# Patient Record
Sex: FEMALE | Race: WHITE | NOT HISPANIC OR LATINO | Employment: FULL TIME | URBAN - METROPOLITAN AREA
[De-identification: names, ages, dates, MRNs, and addresses within clinical notes are randomized per-mention and may not be internally consistent; named-entity substitution may affect disease eponyms.]

---

## 2017-03-07 ENCOUNTER — TELEPHONE (OUTPATIENT)
Dept: OBSTETRICS AND GYNECOLOGY | Facility: CLINIC | Age: 67
End: 2017-03-07

## 2017-03-07 NOTE — TELEPHONE ENCOUNTER
Called pt to discuss notice Dr. ORTIZ received from [employer] [place] re: pt use of Minivelle, and risks/benefits of estrogen.  Pt states she has discussed this with Dr Johnson and in her opinion, benefits far-outweigh the risks.

## 2017-06-15 ENCOUNTER — OFFICE VISIT (OUTPATIENT)
Dept: OBSTETRICS AND GYNECOLOGY | Facility: CLINIC | Age: 67
End: 2017-06-15

## 2017-06-15 VITALS
SYSTOLIC BLOOD PRESSURE: 106 MMHG | HEIGHT: 65 IN | WEIGHT: 150 LBS | DIASTOLIC BLOOD PRESSURE: 72 MMHG | BODY MASS INDEX: 24.99 KG/M2

## 2017-06-15 DIAGNOSIS — N81.6 RECTOCELE: ICD-10-CM

## 2017-06-15 DIAGNOSIS — Z01.419 ENCOUNTER FOR GYNECOLOGICAL EXAMINATION WITHOUT ABNORMAL FINDING: ICD-10-CM

## 2017-06-15 DIAGNOSIS — Z79.890 POST-MENOPAUSE ON HRT (HORMONE REPLACEMENT THERAPY): ICD-10-CM

## 2017-06-15 DIAGNOSIS — Z78.0 MENOPAUSE: Primary | ICD-10-CM

## 2017-06-15 DIAGNOSIS — N95.2 VAGINAL ATROPHY: ICD-10-CM

## 2017-06-15 PROCEDURE — 99397 PER PM REEVAL EST PAT 65+ YR: CPT | Performed by: OBSTETRICS & GYNECOLOGY

## 2017-06-15 RX ORDER — ESTRADIOL 0.04 MG/D
1 FILM, EXTENDED RELEASE TRANSDERMAL 2 TIMES WEEKLY
Qty: 8 PATCH | Refills: 12 | Status: SHIPPED | OUTPATIENT
Start: 2017-06-15 | End: 2017-06-19

## 2017-06-15 NOTE — PROGRESS NOTES
"   Chief Complaint   Patient presents with   • Gynecologic Exam   • Rectal Prolapse   • Med Refill       Patricia Mccallum is a 66 y.o. year old  presenting to be seen for her annual exam.This patient has previously had an AH/BSO.  She has had an appendectomy.  She has a known grade 2 rectocele and complains of some pelvic pressure and some bulging from the vagina.  She currently uses estradiol patches, 0.0375 mg every 3-1/2 days and is asymptomatic.  She uses Premarin vaginal cream 1 g weekly.    SCREENING TESTS    Year 2012   Age                         PAP                         HPV high risk                         Mammogram     benign                    CYNDI score                         Breast MRI                         Lipids                         Vitamin D                         Colonoscopy                         DEXA  Frax (hip/any)                         Ovarian Screen                           Enter the month test was performed.  If month not known, enter \"X'  · Black numbers = normal results  · Red numbers = abnormal results  · Black X = patient reported normal  · Red X - patient reported abnormal      Referred by:    Profession:    Other info:        She exercises regularly: yes.  She wears her seat belt: yes.  She has concerns about domestic violence: no.  She has noticed changes in height: no    GYN screening history:  · Last mammogram: was done on approximately 2016 and the result was: Birads II (Benign findings)..    No Additional Complaints Reported    The following portions of the patient's history were reviewed and updated as appropriate:vital signs and   She  does not have any pertinent problems on file.  She  has a past surgical history that includes Total abdominal hysterectomy w/ bilateral salpingoophorectomy (); Appendectomy (); Breast Reduction (); Cholecystectomy; " "Breast reconstruction (06/2015); Hysterectomy; and Reduction mammaplasty.  Her family history includes Heart attack in her father; Hypertension in her mother; Lung cancer in her mother; Melanoma in her brother and brother; No Known Problems in her daughter, maternal aunt, maternal grandmother, paternal aunt, paternal grandmother, sister, and son. There is no history of BRCA 1/2, Breast cancer, Colon cancer, Endometrial cancer, or Ovarian cancer.  She  reports that she has never smoked. She has never used smokeless tobacco. She reports that she does not drink alcohol or use illicit drugs.  Current Outpatient Prescriptions   Medication Sig Dispense Refill   • ALPRAZolam (XANAX) 0.5 MG tablet Take 0.25 mg by mouth every night.     • Bisacodyl (DULCOLAX PO) Take 1 tablet by mouth Daily.     • calcium carbonate (OS-SEBASTIAN) 600 MG tablet Take 600 mg by mouth daily.     • conjugated estrogens (PREMARIN) 0.625 MG/GM vaginal cream Insert  into the vagina 1 (One) Time Per Week. Insert 1.0 grams intravaginally once a week 30 g 3   • Cyanocobalamin (VITAMIN B-12 PO) Take  by mouth.     • diclofenac (VOLTAREN) 1 % gel gel   0   • DOCUSATE SODIUM PO Take  by mouth.     • fexofenadine (ALLEGRA) 180 MG tablet Take 180 mg by mouth daily.     • estradiol (VIVELLE-DOT) 0.0375 MG/24HR Place 1 patch on the skin 2 (Two) Times a Week for 4 days. 8 patch 12   • Lansoprazole (PREVACID PO) Take  by mouth.       No current facility-administered medications for this visit.      She is allergic to codeine; penicillins; and sudafed [pseudoephedrine]..    Review of Systems  A comprehensive review of systems was taken.  Constitutional: negative for fever, chills, activity change, appetite change, fatigue and unexpected weight change.  Respiratory: negative  Cardiovascular: negative  Gastrointestinal: positive for difficulty expelling stool  Genitourinary:pressure  Musculoskeletal:negative  Behavioral/Psych: negative       /72  Ht 65\" (165.1 " cm)  Wt 150 lb (68 kg)  LMP  (LMP Unknown)  BMI 24.96 kg/m2    Physical Exam    General:  alert; cooperative; well developed; well nourished   Skin:  No suspicious lesions seen   Thyroid: normal to inspection and palpation   Lungs:  clear to auscultation bilaterally   Heart:  regular rate and rhythm, S1, S2 normal, no murmur, click, rub or gallop   Breasts:  Examined in supine position  Symmetric without masses or skin dimpling  Nipples normal without inversion, lesions or discharge  There are no palpable axillary nodes  scars   Abdomen: soft, non-tender; no masses  no umbilical or inginual hernias are present  no hepato-splenomegaly   Pelvis: Clinical staff was present for exam  External genitalia:  normal appearance of the external genitalia including Bartholin's and [place]'s glands.  Vaginal:  normal pink mucosa without prolapse or lesions.  Cervix:  absent.  Uterus:  absent.  Adnexa:  absent, bilateral.  Rectal:  anus visually normal appearing. recto-vaginal exam unremarkable and confirms findings;  Rectocele GRADE 2     Lab Review   No data reviewed    Imaging  Mammogram results- benign         ASSESSMENT  Problems Addressed this Visit        Digestive    Rectocele    Relevant Medications    Bisacodyl (DULCOLAX PO)       Genitourinary    Vaginal atrophy    Relevant Medications    conjugated estrogens (PREMARIN) 0.625 MG/GM vaginal cream    Post-menopause on HRT (hormone replacement therapy)    RESOLVED: Menopause - Primary    Relevant Medications    estradiol (VIVELLE-DOT) 0.0375 MG/24HR      Other Visit Diagnoses     Encounter for gynecological examination without abnormal finding              PLAN    Medications prescribed this encounter:    New Medications Ordered This Visit   Medications   • Bisacodyl (DULCOLAX PO)     Sig: Take 1 tablet by mouth Daily.   • conjugated estrogens (PREMARIN) 0.625 MG/GM vaginal cream     Sig: Insert  into the vagina 1 (One) Time Per Week. Insert 1.0 grams intravaginally once  a week     Dispense:  30 g     Refill:  3   • estradiol (VIVELLE-DOT) 0.0375 MG/24HR     Sig: Place 1 patch on the skin 2 (Two) Times a Week for 4 days.     Dispense:  8 patch     Refill:  12   · I have had a lengthy discussion with this patient about her rectocele and possible surgical intervention.  I have discussed posterior repair.  I have given her pamphlets about this procedure.  · Calcium, 600 mg/ Vit. D, 400 IU daily; regular weight-bearing exercise  · Follow up: 12 month(s)  *Please note that portions of this documentation may have been completed with a voice recognition program.  Efforts were made to edit this dictation, but occasional words may have been mistranscribed.       This note was electronically signed.    BIBIANA Johnson MD  Ana 15, 2017  11:29 AM

## 2017-06-26 RX ORDER — ESTRADIOL 0.05 MG/D
1 FILM, EXTENDED RELEASE TRANSDERMAL 2 TIMES WEEKLY
Qty: 24 PATCH | Refills: 4 | Status: SHIPPED | OUTPATIENT
Start: 2017-06-26

## 2017-06-26 NOTE — TELEPHONE ENCOUNTER
Patient states that there was some confusion about the dose of her estrogen patch when she was here 6/17/17.  She states that she is on 0.05 mg twice each week, and desires that a prescription be sent to her mail order pharmacy.  OK per Dr. Johnson.

## 2017-07-31 ENCOUNTER — TELEPHONE (OUTPATIENT)
Dept: ORTHOPEDIC SURGERY | Facility: CLINIC | Age: 67
End: 2017-07-31

## 2017-07-31 NOTE — TELEPHONE ENCOUNTER
----- Message from Tisha Zuniga sent at 7/31/2017  9:55 AM EDT -----  PATIENT IS REQUESTING A NEW SCRIPT FOR ORTHOTICS TO BE FAXED TO [facility]    IF ANY QUESTIONS CALL PATIENT -[phone]

## 2017-11-27 ENCOUNTER — TRANSCRIBE ORDERS (OUTPATIENT)
Dept: OBSTETRICS AND GYNECOLOGY | Facility: CLINIC | Age: 67
End: 2017-11-27

## 2017-11-27 DIAGNOSIS — Z12.31 VISIT FOR SCREENING MAMMOGRAM: Primary | ICD-10-CM

## 2017-11-30 ENCOUNTER — OFFICE VISIT (OUTPATIENT)
Dept: OBSTETRICS AND GYNECOLOGY | Facility: CLINIC | Age: 67
End: 2017-11-30

## 2017-11-30 VITALS
HEIGHT: 65 IN | SYSTOLIC BLOOD PRESSURE: 118 MMHG | BODY MASS INDEX: 25.19 KG/M2 | WEIGHT: 151.2 LBS | DIASTOLIC BLOOD PRESSURE: 72 MMHG

## 2017-11-30 DIAGNOSIS — N81.6 RECTOCELE: Primary | ICD-10-CM

## 2017-11-30 DIAGNOSIS — N81.5 VAGINAL ENTEROCELE: ICD-10-CM

## 2017-11-30 PROCEDURE — 99214 OFFICE O/P EST MOD 30 MIN: CPT | Performed by: OBSTETRICS & GYNECOLOGY

## 2017-11-30 RX ORDER — VITAMIN E 268 MG
400 CAPSULE ORAL DAILY
COMMUNITY
End: 2018-01-24

## 2017-11-30 NOTE — PROGRESS NOTES
Chief Complaint   Patient presents with   • Rectal Prolapse     discuss repair       Patricia Mccallum is a 66 y.o. year old  presenting to be seen because of complaints that she is having more vaginal bulging and pressure.  She has also noted that she has had some difficulty emptying her bladder due to the pressure from the rectocele.  She denies vaginal bleeding or irritation.  She is currently using estradiol patches every 3-1/2 days and is asymptomatic.    History   Sexual Activity   • Sexual activity: No       SCREENING TESTS    Year 2012   Age                         PAP                         HPV high risk                         Mammogram      benign                   CYNDI score                         Breast MRI                         Lipids                         Vitamin D                         Colonoscopy                         DEXA  Frax (hip/any)                         Ovarian Screen                             No Additional Complaints Reported    The following portions of the patient's history were reviewed and updated as appropriate:vital signs and   She  does not have any pertinent problems on file.  She  has a past surgical history that includes Total abdominal hysterectomy w/ bilateral salpingoophorectomy (); Appendectomy (); Breast Reduction (); Cholecystectomy; Breast reconstruction (2015); Hysterectomy; and Reduction mammaplasty.  Her family history includes Heart attack in her father; Hypertension in her mother; Lung cancer in her mother; Melanoma in her brother and brother; No Known Problems in her daughter, maternal aunt, maternal grandmother, paternal aunt, paternal grandmother, sister, and son. There is no history of BRCA 1/2, Breast cancer, Colon cancer, Endometrial cancer, or Ovarian cancer.  She  reports that she has never smoked. She has never used smokeless  "tobacco. She reports that she does not drink alcohol or use illicit drugs.  Current Outpatient Prescriptions   Medication Sig Dispense Refill   • ALPRAZolam (XANAX) 0.5 MG tablet Take 0.25 mg by mouth every night.     • calcium carbonate (OS-SEBASTIAN) 600 MG tablet Take 600 mg by mouth daily.     • conjugated estrogens (PREMARIN) 0.625 MG/GM vaginal cream Insert  into the vagina 1 (One) Time Per Week. Insert 1.0 grams intravaginally once a week 30 g 3   • Cyanocobalamin (VITAMIN B-12 PO) Take  by mouth.     • diclofenac (VOLTAREN) 1 % gel gel apply 4 grams to affected area four times a day if needed 1 tube 1   • DOCUSATE SODIUM PO Take  by mouth.     • estradiol (VIVELLE-DOT) 0.05 MG/24HR patch Place 1 patch on the skin 2 (Two) Times a Week. 24 patch 4   • fexofenadine (ALLEGRA) 180 MG tablet Take 180 mg by mouth daily.     • vitamin E 400 UNIT capsule Take 400 Units by mouth Daily.     • Bisacodyl (DULCOLAX PO) Take 1 tablet by mouth Daily.     • Lansoprazole (PREVACID PO) Take  by mouth.       No current facility-administered medications for this visit.      She is allergic to codeine; penicillins; and sudafed [pseudoephedrine]..        Review of Systems  A comprehensive review of systems was done.  Constitutional: negative for fever, chills, activity change, appetite change, fatigue and unexpected weight change.  Respiratory: negative  Cardiovascular: negative  Gastrointestinal: negative  Genitourinary:pelvic pressure and vaginal bulging  Musculoskeletal:negative  Behavioral/Psych: negative          /72  Ht 65\" (165.1 cm)  Wt 151 lb 3.2 oz (68.6 kg)  LMP  (LMP Unknown) Comment: S/P AH, BSO  BMI 25.16 kg/m2    Physical Exam    General:  alert; cooperative; well developed; well nourished   Skin:  No suspicious lesions seen   Thyroid: normal to inspection and palpation   Lungs:  clear to auscultation bilaterally   Heart:  regular rate and rhythm, S1, S2 normal, no murmur, click, rub or gallop   Breasts:  Not " performed.   Abdomen: soft, non-tender; no masses  no umbilical or inginual hernias are present  no hepato-splenomegaly   Pelvis: Clinical staff was present for exam  External genitalia:  normal appearance of the external genitalia including Bartholin's and [place]'s glands.  Vaginal:  normal pink mucosa without prolapse or lesions.  Cervix:  absent.  Uterus:  absent.  Adnexa:  non palpable bilaterally.  Rectocele GRADE 2  Enterocele GRADE 2  urethrocele     Lab Review   No data reviewed    Imaging   Mammogram results    Medical counseling was given to patient for the following topics: diagnostic results, instructions for management, risk factor reductions, impressions and risks and benefits of treatment options . Total time of the encounter was 32 minute(s) and 25 minute(s)  was spent in face to face counseling.  I have counseled the patient that I do not believe her grade 2 rectocele/grade 2 enterocele has worsened significantly.  However, since she is having more pelvic pressure and bulging I would recommend that these defects be repaired.  I have indicated that I did not feel her urethrocele needs repair.  I have recommended a posterior repair/enterocele repair.  I have described for her the surgical procedures including the risks of bleeding, infection, rectal injury, bowel injury, and anesthetic risks.     ASSESSMENT  Problems Addressed this Visit        Digestive    Rectocele - Primary    Vaginal enterocele            PLAN    Medications prescribed this encounter:    New Medications Ordered This Visit   Medications   • vitamin E 400 UNIT capsule     Sig: Take 400 Units by mouth Daily.   · January for surgery with Posterior Repair/Enterocele Repair  · Follow up: 6 week(s)  · Calcium, 600 mg/ Vit. D, 400 IU daily     *Please note that portions of this documentation may have been completed with a voice recognition program.  Efforts were made to edit this dictation, but occasional words may have been  mistranscribed.     This note was electronically signed.    BIBIANA Johnson MD  November 30, 2017  3:42 PM

## 2017-12-18 DIAGNOSIS — N81.5 VAGINAL ENTEROCELE: ICD-10-CM

## 2017-12-18 DIAGNOSIS — N81.6 RECTOCELE: Primary | ICD-10-CM

## 2018-01-09 ENCOUNTER — HOSPITAL ENCOUNTER (OUTPATIENT)
Dept: MAMMOGRAPHY | Facility: HOSPITAL | Age: 68
Discharge: HOME | End: 2018-01-09
Attending: OBSTETRICS & GYNECOLOGY | Admitting: OBSTETRICS & GYNECOLOGY

## 2018-01-09 DIAGNOSIS — Z12.31 VISIT FOR SCREENING MAMMOGRAM: ICD-10-CM

## 2018-01-09 PROCEDURE — 77067 SCR MAMMO BI INCL CAD: CPT | Performed by: RADIOLOGY

## 2018-01-09 PROCEDURE — 77063 BREAST TOMOSYNTHESIS BI: CPT

## 2018-01-09 PROCEDURE — 77067 SCR MAMMO BI INCL CAD: CPT

## 2018-01-09 PROCEDURE — 77063 BREAST TOMOSYNTHESIS BI: CPT | Performed by: RADIOLOGY

## 2018-01-22 ENCOUNTER — DOCUMENTATION (OUTPATIENT)
Dept: OBSTETRICS AND GYNECOLOGY | Facility: CLINIC | Age: 68
End: 2018-01-22

## 2018-01-22 NOTE — PROGRESS NOTES
History and Physical    Chief Complaint: Vaginal bulging and pelvic pressure    Patricia Mccallum is a 67 y.o., , presented to the office on 2017 with complaints of vaginal bulging and pressure.  She had experienced some difficulty emptying her bladder.  She was found to have a vaginal enterocele and a rectocele on examination.  She desires repair of these defects.  The surgical procedure of posterior repair and enterocele repair has been explained to the patient including the risks of rectal injury, bowel injury, bleeding, infection, and anesthetic risks.  She voiced understanding of these risks.  Informed consent was signed  She has previously had an /BSO    Past Medical History:   Diagnosis Date   • Anxiety    • Menopause    • Osteoarthritis    • Rectocele     GRADE 1-2   • Vaginal atrophy        Allergies: Codeine; Penicillins; and Sudafed [pseudoephedrine]    Medications:   Current Outpatient Prescriptions:   •  ALPRAZolam (XANAX) 0.5 MG tablet, Take 0.25 mg by mouth every night., Disp: , Rfl:   •  Bisacodyl (DULCOLAX PO), Take 1 tablet by mouth Daily., Disp: , Rfl:   •  calcium carbonate (OS-SEBASTIAN) 600 MG tablet, Take 600 mg by mouth daily., Disp: , Rfl:   •  conjugated estrogens (PREMARIN) 0.625 MG/GM vaginal cream, Insert  into the vagina 1 (One) Time Per Week. Insert 1.0 grams intravaginally once a week, Disp: 30 g, Rfl: 3  •  Cyanocobalamin (VITAMIN B-12 PO), Take  by mouth., Disp: , Rfl:   •  diclofenac (VOLTAREN) 1 % gel gel, apply 4 grams to affected area four times a day if needed, Disp: 1 tube, Rfl: 1  •  DOCUSATE SODIUM PO, Take  by mouth., Disp: , Rfl:   •  estradiol (VIVELLE-DOT) 0.05 MG/24HR patch, Place 1 patch on the skin 2 (Two) Times a Week., Disp: 24 patch, Rfl: 4  •  fexofenadine (ALLEGRA) 180 MG tablet, Take 180 mg by mouth daily., Disp: , Rfl:   •  Lansoprazole (PREVACID PO), Take  by mouth., Disp: , Rfl:   •  vitamin E 400 UNIT capsule, Take 400 Units by mouth Daily., Disp: , Rfl:  "    Previous Surgery:   Past Surgical History:   Procedure Laterality Date   • APPENDECTOMY  1981    WITH AH/BSO   • BILATERAL BREAST REDUCTION  1983   • BREAST RECONSTRUCTION  06/2015   • CHOLECYSTECTOMY     • HYSTERECTOMY     • REDUCTION MAMMAPLASTY     • TOTAL ABDOMINAL HYSTERECTOMY WITH SALPINGO OOPHORECTOMY  1981    BSO        Review of Systems  A comprehensive review of systems was negative.  Constitutional: negative for fever, chills, activity change, appetite change, fatigue and unexpected weight change.  Respiratory: negative  Cardiovascular: negative  Gastrointestinal: negative  Genitourinary:pelvic pressure, no incontinence  Musculoskeletal:negative  Behavioral/Psych: negative      Social History   Substance Use Topics   • Smoking status: Never Smoker   • Smokeless tobacco: Never Used   • Alcohol use No       LMP  (LMP Unknown) Comment: S/P AH, BSO    V.S.- BP- 118/72; Pulse- 84; Ht.-65\"; Wt.- 88.6 kg; BMI- 25.16 kg/M2    Physical Exam  General:  alert; cooperative; well developed; well nourished   Skin:  No suspicious lesions seen   Thyroid: normal to inspection and palpation   Lungs:  breathing is unlabored  clear to auscultation bilaterally   Heart:  regular rate and rhythm, S1, S2 normal, no murmur, click, rub or gallop   Breasts:  Examined in supine position  Symmetric without masses or skin dimpling  Nipples normal without inversion, lesions or discharge  There are no palpable axillary nodes   Abdomen: soft, non-tender; no masses  no umbilical or inginual hernias are present  no hepato-splenomegaly   Pelvis: Clinical staff was present for exam  External genitalia:  normal appearance of the external genitalia including Bartholin's and [place]'s glands.  Vaginal:  normal pink mucosa without prolapse or lesions.  Cervix:  absent.  Uterus:  absent.  Adnexa:  non palpable bilaterally.  Rectal:  anus visually normal appearing. recto-vaginal exam unremarkable and confirms findings;  Rectocele GRADE " 2  Enterocele GRADE 2         Injury to bowel, Injury to rectum, bleeding, infection and anesthestic risks were explained to the patient and she voiced understanding. Informed consent was signed.    No contraindications to planned surgery were detected      Impression: !) Rectocele- grade 2, 2) Enterocele- grade 2        Plan: Posterior repair Enterocele repair.      *Please note that portions of this documentation may have been completed with a voice recognition program.  Efforts were made to edit this dictation, but occasional words may have been mistranscribed.  This note was electronically signed.    BIBIANA Johnson MD  January 22, 2018  1:31 PM

## 2018-01-24 ENCOUNTER — OFFICE VISIT (OUTPATIENT)
Dept: OBSTETRICS AND GYNECOLOGY | Facility: CLINIC | Age: 68
End: 2018-01-24

## 2018-01-24 ENCOUNTER — TELEPHONE (OUTPATIENT)
Dept: OBSTETRICS AND GYNECOLOGY | Facility: CLINIC | Age: 68
End: 2018-01-24

## 2018-01-24 VITALS
WEIGHT: 149.8 LBS | HEIGHT: 65 IN | SYSTOLIC BLOOD PRESSURE: 110 MMHG | DIASTOLIC BLOOD PRESSURE: 78 MMHG | BODY MASS INDEX: 24.96 KG/M2

## 2018-01-24 DIAGNOSIS — N81.5 VAGINAL ENTEROCELE: ICD-10-CM

## 2018-01-24 DIAGNOSIS — N81.6 RECTOCELE: Primary | ICD-10-CM

## 2018-01-24 PROCEDURE — 99213 OFFICE O/P EST LOW 20 MIN: CPT | Performed by: OBSTETRICS & GYNECOLOGY

## 2018-01-24 RX ORDER — ASCORBIC ACID 500 MG
500 TABLET ORAL DAILY
COMMUNITY
End: 2021-08-31

## 2018-01-24 RX ORDER — INFLUENZA VACCINE, ADJUVANTED 15; 15; 15 UG/.5ML; UG/.5ML; UG/.5ML
INJECTION, SUSPENSION INTRAMUSCULAR
Refills: 0 | COMMUNITY
Start: 2017-12-13

## 2018-01-24 RX ORDER — PNEUMOCOCCAL 13-VALENT CONJUGATE VACCINE 2.2; 2.2; 2.2; 2.2; 2.2; 4.4; 2.2; 2.2; 2.2; 2.2; 2.2; 2.2; 2.2 UG/.5ML; UG/.5ML; UG/.5ML; UG/.5ML; UG/.5ML; UG/.5ML; UG/.5ML; UG/.5ML; UG/.5ML; UG/.5ML; UG/.5ML; UG/.5ML; UG/.5ML
INJECTION, SUSPENSION INTRAMUSCULAR
Refills: 0 | COMMUNITY
Start: 2017-12-13

## 2018-01-24 NOTE — PROGRESS NOTES
Chief Complaint   Patient presents with   • Follow-up     patient scheduled for posterior repair on Friday.  wants to be sure that she doesn't need bladder repair.       Patricia Mccallum is a 67 y.o. year old  presenting to be seen because of a desire to have her bladder evaluated.  This patient has a grade 2 rectocele and a grade 2 enterocele.  She has some difficulty expelling stool.  She is scheduled for a posterior repair/enterocele repair at [facility].  She has previously had an AH/BSO in .  She denies urinary incontinence.  She states that she suppresses the rectocele in order to be able to void normally.  She has been trying to void more frequently.    History   Sexual Activity   • Sexual activity: No     SCREENING TESTS    Year 2012   Age                         PAP                         HPV high risk                         Mammogram     benign benign benign                  CYNDI score                         Breast MRI                         Lipids                         Vitamin D                         Colonoscopy                         DEXA  Frax (hip/any)                         Ovarian Screen                             No Additional Complaints Reported    The following portions of the patient's history were reviewed and updated as appropriate:vital signs and   She  does not have any pertinent problems on file.  She  has a past surgical history that includes Total abdominal hysterectomy w/ bilateral salpingoophorectomy (); Appendectomy (); Breast Reduction (); Cholecystectomy; Breast reconstruction (2015); Hysterectomy; and Reduction mammaplasty.  Current Outpatient Prescriptions   Medication Sig Dispense Refill   • vitamin C (ASCORBIC ACID) 500 MG tablet Take 500 mg by mouth Daily.     • ALPRAZolam (XANAX) 0.5 MG tablet Take 0.25 mg by mouth every night.     • Bisacodyl  "(DULCOLAX PO) Take 1 tablet by mouth Daily.     • calcium carbonate (OS-SEBASTIAN) 600 MG tablet Take 600 mg by mouth daily.     • conjugated estrogens (PREMARIN) 0.625 MG/GM vaginal cream Insert  into the vagina 1 (One) Time Per Week. Insert 1.0 grams intravaginally once a week 30 g 3   • Cyanocobalamin (VITAMIN B-12 PO) Take  by mouth.     • diclofenac (VOLTAREN) 1 % gel gel apply 4 grams to affected area four times a day if needed 1 tube 1   • DOCUSATE SODIUM PO Take  by mouth.     • estradiol (VIVELLE-DOT) 0.05 MG/24HR patch Place 1 patch on the skin 2 (Two) Times a Week. 24 patch 4   • fexofenadine (ALLEGRA) 180 MG tablet Take 180 mg by mouth daily.     • FLUAD 0.5 ML suspension prefilled syringe inject 0.5 milliliter intramuscularly  0   • PREVNAR 13 vaccine inject 0.5 milliliter intramuscularly  0     No current facility-administered medications for this visit.      She is allergic to codeine; penicillins; and sudafed [pseudoephedrine]..        Review of Systems  A comprehensive review of systems was not done.  Constitutional: negative for fever, chills, activity change, appetite change, fatigue and unexpected weight change.  Respiratory: not done  Cardiovascular: not done  Gastrointestinal: positive for difficulty expelling stool  Genitourinary:positive for vaginal pressure  Musculoskeletal:negative  Behavioral/Psych: not done          /78  Ht 165.1 cm (65\")  Wt 67.9 kg (149 lb 12.8 oz)  LMP  (LMP Unknown) Comment: S/P AH, BSO  BMI 24.93 kg/m2    Physical Exam    General:  alert; cooperative; well developed; well nourished   Skin:  Not performed.   Thyroid: not examined   Lungs:  clear to auscultation bilaterally   Heart:  regular rate and rhythm, S1, S2 normal, no murmur, click, rub or gallop   Breasts:  Not performed.   Abdomen: soft, non-tender; no masses  no umbilical or inginual hernias are present  no hepato-splenomegaly   Pelvis: Clinical staff was present for exam  External genitalia:  normal " appearance of the external genitalia including Bartholin's and [place]'s glands.  Vaginal:  normal pink mucosa without prolapse or lesions.  Cervix:  absent.  Uterus:  absent.  Adnexa:  absent, bilateral.  Rectocele GRADE 2  Enterocele GRADE 2     Lab Review   No data reviewed    Imaging   Mammogram results- benign    Medical counseling was given to patient for the following topics: diagnostic results, instructions for management, risk factor reductions and impressions . Total time of the encounter was 18 minute(s) and 12 minute(s)  was spent in face to face counseling.  I have counseled the patient that she does have a grade 2 rectocele and grade 2 enterocele.  I have counseled her that the urethra is visible when the labia are spread apart.  I counseled her that she has a minimal cystocele and that I do not feel that an anterior repair is indicated at present.  I have counseled her about leaning forward after she finishes voiding to completely empty her bladder.  I have counseled her once again about posterior repair/enterocele repair including the risks of pain, bleeding, infection, and rectal injury.  She voiced understanding of these risks again          ASSESSMENT  Problems Addressed this Visit        Digestive    Rectocele - Primary    Vaginal enterocele            PLAN    Medications prescribed this encounter:    New Medications Ordered This Visit   Medications   • FLUAD 0.5 ML suspension prefilled syringe     Sig: inject 0.5 milliliter intramuscularly     Refill:  0   • PREVNAR 13 vaccine     Sig: inject 0.5 milliliter intramuscularly     Refill:  0   • vitamin C (ASCORBIC ACID) 500 MG tablet     Sig: Take 500 mg by mouth Daily.   · Posterior repair/Enterocele repair on 1/26/2018  · Follow up: 2 day(s) for surgery  · Calcium, 600 mg/ Vit. D, 400 IU daily     *Please note that portions of this documentation may have been completed with a voice recognition program.  Efforts were made to edit this dictation, but  occasional words may have been mistranscribed.     This note was electronically signed.    BIBIANA Johnson MD  January 24, 2018  1:21 PM

## 2018-01-24 NOTE — TELEPHONE ENCOUNTER
"Pt calling to discuss her Posterior Enterocele Repair scheduled for 1/26/18. I confirmed that we received her PCP's clearance for surgery, and we confirmed that her arrival time on Friday will be at 6:30 AM. She states she is concerned about \"bladder and difficulty voiding at times\" - wondering if she needs something done to correct that also. Told pt that she should come in ASAP so that Dr Johnson can check this in case it is necessary to add any bladder procedure. She will come in today at 1 PM.    "

## 2018-01-26 ENCOUNTER — OUTSIDE FACILITY SERVICE (OUTPATIENT)
Dept: OBSTETRICS AND GYNECOLOGY | Facility: CLINIC | Age: 68
End: 2018-01-26

## 2018-01-26 PROCEDURE — 57250 REPAIR RECTUM & VAGINA: CPT | Performed by: OBSTETRICS & GYNECOLOGY

## 2018-01-26 PROCEDURE — 57268 REPAIR OF BOWEL BULGE: CPT | Performed by: OBSTETRICS & GYNECOLOGY

## 2018-01-29 ENCOUNTER — TELEPHONE (OUTPATIENT)
Dept: OBSTETRICS AND GYNECOLOGY | Facility: CLINIC | Age: 68
End: 2018-01-29

## 2018-01-29 NOTE — TELEPHONE ENCOUNTER
Pt calling with questions after Posterior /Enterocele repair done 1/26/18. Concerned about constipation - states she had a very small BM yesterday but wonders if she needs more that the Colace 100 mg BID. Also c/o very sore/irritated rectum from all the loose stools she had prior to procedure (she chose to take magnesium citrate as bowel prep instead of Bisacodyl 5 mg tabs x 2). Asking if it is OK to use Preparation H suppository for rectal irritation. Per DR ORTIZ: may try using Prep H supp but if does not relieve anal discomfort she should call back and we will send in RX for Analpram     Pt states she is doing really well - no complaints of pain but only mild discomfort. She is using sitz bath 3 x daily, has been out walking her dog and says she feels better being up and moving.

## 2018-02-22 ENCOUNTER — OFFICE VISIT (OUTPATIENT)
Dept: OBSTETRICS AND GYNECOLOGY | Facility: CLINIC | Age: 68
End: 2018-02-22

## 2018-02-22 VITALS
SYSTOLIC BLOOD PRESSURE: 124 MMHG | DIASTOLIC BLOOD PRESSURE: 72 MMHG | BODY MASS INDEX: 25.46 KG/M2 | HEIGHT: 65 IN | WEIGHT: 152.8 LBS

## 2018-02-22 DIAGNOSIS — Z98.890 POSTOPERATIVE STATE: Primary | ICD-10-CM

## 2018-02-22 PROCEDURE — 99024 POSTOP FOLLOW-UP VISIT: CPT | Performed by: OBSTETRICS & GYNECOLOGY

## 2018-02-22 RX ORDER — LANSOPRAZOLE 15 MG/1
15 CAPSULE, DELAYED RELEASE ORAL DAILY
COMMUNITY

## 2018-02-22 RX ORDER — VITAMIN E 268 MG
400 CAPSULE ORAL DAILY
COMMUNITY
End: 2021-08-31

## 2018-02-22 NOTE — PROGRESS NOTES
Chief Complaint   Patient presents with   • Post-op       Patricia Mccallum is a 67 y.o. year old  presenting to be seen for her postoperative visit following a posterior repair/enterocele repair and perineoplasty at [facility].  She has had no postoperative problems.  She complains of some pressure intermittently.  She had discontinued her Premarin cream and has some vaginal dryness.  Bowel movements and urination are normal    Procedure:  Enterocoele Repair and Posterior Repair    ROS:  A comprehensive review of systems was negative.  Constitutional: negative for fever, chills, activity change, appetite change, fatigue and unexpected weight change.  Respiratory: negative   Cardiovascular: negative  Gastrointestinal: negative  Genitourinary:negative  Musculoskeletal:negative  Behavioral/Psych: negative      Symptoms:  Fever  No, Chills/Sweat  No, Nausea/Vomiting    No, Normal Bowel Function  Yes, Normal Urinary Function  Yes and Abnormal Discharge   No    No spotting        Physical Exam:  Lungs:  Normal expansion.  Clear to auscultation.  No rales, rhonchi, or wheezing.  Abdomen:  Soft, non-tender, normal bowel sounds; no bruits, organomegaly or masses.   Pelvic:  Clinical staff was present for exam  External genitalia:  normal appearance of the external genitalia including Bartholin's and [place]'s glands.  Vaginal:  atrophic mucosal changes are present; suture lines intact and dry          Impression: 1) Status post Posterior/Enterocele Repair       PLAN:  1. Follow up: 6 month(s)   2. Resume use of Premarin vaginal cream, 0.5 g twice a week  *Please note that portions of this documentation may have been completed with a voice recognition program.  Efforts were made to edit this dictation, but occasional words may have been mistranscribed.      This note was electronically signed.    BIBIANA Johnson MD  2018  11:38 AM

## 2018-03-26 ENCOUNTER — OFFICE VISIT (OUTPATIENT)
Dept: OBSTETRICS AND GYNECOLOGY | Facility: CLINIC | Age: 68
End: 2018-03-26

## 2018-03-26 ENCOUNTER — APPOINTMENT (OUTPATIENT)
Dept: LAB | Facility: HOSPITAL | Age: 68
End: 2018-03-26

## 2018-03-26 VITALS
HEIGHT: 65 IN | DIASTOLIC BLOOD PRESSURE: 74 MMHG | SYSTOLIC BLOOD PRESSURE: 110 MMHG | WEIGHT: 154.2 LBS | BODY MASS INDEX: 25.69 KG/M2

## 2018-03-26 DIAGNOSIS — N90.89 VULVAR IRRITATION: ICD-10-CM

## 2018-03-26 DIAGNOSIS — R35.0 URINARY FREQUENCY: Primary | ICD-10-CM

## 2018-03-26 PROBLEM — N81.5 VAGINAL ENTEROCELE: Status: RESOLVED | Noted: 2017-11-30 | Resolved: 2018-03-26

## 2018-03-26 PROBLEM — K21.9 GASTROESOPHAGEAL REFLUX DISEASE WITHOUT ESOPHAGITIS: Status: ACTIVE | Noted: 2018-03-26

## 2018-03-26 LAB
BILIRUB UR QL STRIP: NEGATIVE
CLARITY UR: CLEAR
COLOR UR: YELLOW
GLUCOSE UR STRIP-MCNC: NEGATIVE MG/DL
HGB UR QL STRIP.AUTO: NEGATIVE
KETONES UR QL STRIP: NEGATIVE
LEUKOCYTE ESTERASE UR QL STRIP.AUTO: NEGATIVE
NITRITE UR QL STRIP: NEGATIVE
PH UR STRIP.AUTO: <=5 [PH] (ref 5–8)
PROT UR QL STRIP: NEGATIVE
SP GR UR STRIP: 1.01 (ref 1–1.03)
UROBILINOGEN UR QL STRIP: NORMAL

## 2018-03-26 PROCEDURE — 99213 OFFICE O/P EST LOW 20 MIN: CPT | Performed by: OBSTETRICS & GYNECOLOGY

## 2018-03-26 PROCEDURE — 81003 URINALYSIS AUTO W/O SCOPE: CPT | Performed by: OBSTETRICS & GYNECOLOGY

## 2018-03-26 NOTE — PROGRESS NOTES
Chief Complaint   Patient presents with   • Urinary Frequency     urgency, pressure, vulvar tenderness       Patricia Mccallum is a 67 y.o. year old  presenting to be seen because of complaints of urinary frequency and urgency and difficulty initiating micturition.  This patient has previously had an AH/BSO.  She recently underwent a posterior repair/enterocele repair for grade 2 rectocele/grade 2 enterocele.  She had no postoperative problems.  She has resumed use of Premarin vaginal cream, 1 g twice a week.  She also uses estradiol patches, 0.05 mg every 3-1/2 days and is asymptomatic.  She developed urinary symptoms 3 days ago.  She has mild dysuria.  She has no leakage of urine.  She states that she has pelvic pressure.  She states that prior to having a bowel movement the pressure is rather significant and then is relieved after having a bowel movement.  She complains of vulvar irritation and states that she has been using bubble bath nightly.      History   Sexual Activity   • Sexual activity: No     SCREENING TESTS    Year 2012   Age                         PAP                         HPV high risk                         Mammogram     benign  benign                  CYNDI score                         Breast MRI                         Lipids                         Vitamin D                         Colonoscopy                         DEXA  Frax (hip/any)                         Ovarian Screen                             No Additional Complaints Reported    The following portions of the patient's history were reviewed and updated as appropriate:vital signs and   She  does not have any pertinent problems on file.  She  has a past surgical history that includes Total abdominal hysterectomy w/ bilateral salpingoophorectomy (); Appendectomy (); Breast Reduction (); Cholecystectomy; Breast  reconstruction (06/2015); Hysterectomy; Reduction mammaplasty; Posterior repair; and Enterocele repair.  Her family history includes Heart attack in her father; Hypertension in her mother; Lung cancer in her mother; Melanoma in her brother and brother; No Known Problems in her daughter, maternal aunt, maternal grandmother, paternal aunt, paternal grandmother, sister, and son.  She  reports that she has never smoked. She has never used smokeless tobacco. She reports that she does not drink alcohol or use drugs.  Current Outpatient Prescriptions   Medication Sig Dispense Refill   • conjugated estrogens (PREMARIN) 0.625 MG/GM vaginal cream Insert 1 g into the vagina 2 (Two) Times a Week.     • ALPRAZolam (XANAX) 0.5 MG tablet Take 0.25 mg by mouth every night.     • Bisacodyl (DULCOLAX PO) Take 1 tablet by mouth Daily.     • calcium carbonate (OS-SEBASTIAN) 600 MG tablet Take 600 mg by mouth daily.     • Cyanocobalamin (VITAMIN B-12 PO) Take  by mouth.     • diclofenac (VOLTAREN) 1 % gel gel apply 4 grams to affected area four times a day if needed 1 tube 1   • estradiol (VIVELLE-DOT) 0.05 MG/24HR patch Place 1 patch on the skin 2 (Two) Times a Week. 24 patch 4   • fexofenadine (ALLEGRA) 180 MG tablet Take 180 mg by mouth daily.     • FLUAD 0.5 ML suspension prefilled syringe inject 0.5 milliliter intramuscularly  0   • lansoprazole (PREVACID) 15 MG capsule Take 15 mg by mouth Daily.     • PREVNAR 13 vaccine inject 0.5 milliliter intramuscularly  0   • vitamin C (ASCORBIC ACID) 500 MG tablet Take 500 mg by mouth Daily.     • vitamin E 400 UNIT capsule Take 400 Units by mouth Daily.       No current facility-administered medications for this visit.      She is allergic to codeine; penicillins; and sudafed [pseudoephedrine]..        Review of Systems  A comprehensive review of systems was done.  Constitutional: negative for fever, chills, activity change, appetite change, fatigue and unexpected weight change.  Respiratory:  "negative  Cardiovascular: negative  Gastrointestinal: positive for constipation  Genitourinary:positive for vulvar burning, urinary frequency  Musculoskeletal:negative  Behavioral/Psych: negative          /74   Ht 165.1 cm (65\")   Wt 69.9 kg (154 lb 3.2 oz)   LMP  (LMP Unknown) Comment: S/P TEA BSO  BMI 25.66 kg/m²     Physical Exam    General:  alert; cooperative; well developed; well nourished   Skin:  No suspicious lesions seen   Thyroid: normal to inspection and palpation   Lungs:  clear to auscultation bilaterally   Heart:  regular rate and rhythm, S1, S2 normal, no murmur, click, rub or gallop   Breasts:  Not performed.   Abdomen: soft, non-tender; no masses  no umbilical or inginual hernias are present  no hepato-splenomegaly   Pelvis: Clinical staff was present for exam  External genitalia:  normal appearance of the external genitalia including Bartholin's and [place]'s glands.  Vaginal:  normal pink mucosa without prolapse or lesions.  Cervix:  absent.  Uterus:  absent.  Adnexa:  absent, bilateral.  There is no vulvar erythema and no lesions.  There is excellent healing of the repair sites and no ridging of the suture lines.     Lab Review   No data reviewed    Imaging   Mammogram results    Medical counseling was given to patient for the following topics: diagnostic results, instructions for management, risk factor reductions and impressions . Total time of the encounter was 23 minute(s) and 15 minute(s)  was spent in face to face counseling.  I have counseled the patient that there is no evidence of recurrence of her rectocele or enterocele and that healing is adequate.  I have counseled the patient that she does not have evidence of vaginal atrophy.  I have counseled the patient about the need to obtain a catheterized urine specimen to rule out cystitis.  I have counseled the patient to lean forward after she completes voiding to completely empty her bladder.  I have counseled the patient to see " Dr. Curtis Hoang for colonoscopy since she has complaints of pelvic pressure and lower abdominal fullness in the absence of her uterus, tubes, or ovaries.          ASSESSMENT  Problems Addressed this Visit     None      Visit Diagnoses     Urinary frequency    -  Primary    Relevant Orders    Urinalysis With / Culture If Indicated - Urine, Catheter    Vulvar irritation                PLAN    Medications prescribed this encounter:    New Medications Ordered This Visit   Medications   • conjugated estrogens (PREMARIN) 0.625 MG/GM vaginal cream     Sig: Insert 1 g into the vagina 2 (Two) Times a Week.   · Cath U/A sent  · Colonoscopy  · Follow up: 9 month(s)  · Calcium, 600 mg/ Vit. D, 400 IU daily     *Please note that portions of this documentation may have been completed with a voice recognition program.  Efforts were made to edit this dictation, but occasional words may have been mistranscribed.     This note was electronically signed.    BIBIANA Johnson MD  March 26, 2018  10:49 AM

## 2018-04-25 DIAGNOSIS — Z12.11 SCREENING FOR COLON CANCER: Primary | ICD-10-CM

## 2018-04-30 ENCOUNTER — OUTSIDE FACILITY SERVICE (OUTPATIENT)
Dept: GASTROENTEROLOGY | Facility: CLINIC | Age: 68
End: 2018-04-30

## 2018-04-30 PROCEDURE — G0105 COLORECTAL SCRN; HI RISK IND: HCPCS | Performed by: INTERNAL MEDICINE

## 2018-08-01 ENCOUNTER — TELEPHONE (OUTPATIENT)
Dept: ORTHOPEDIC SURGERY | Facility: CLINIC | Age: 68
End: 2018-08-01

## 2018-08-01 NOTE — TELEPHONE ENCOUNTER
PT IS ASKING FOR AN UPDATED ORTHOTICS ORDER FOR HER TURF TOE. I INFORMED HER DR DOTSON WAS OUT OF THE OFFICE UNTIL NEXT Wednesday.

## 2018-08-01 NOTE — TELEPHONE ENCOUNTER
Patient was seen by Dr. Waller July of 2016. She had a rx wrote for turf  toe orthotics. She would like a new rx to get another pair of orthotics. Patient is aware Dr. Waller is out of the office.     Dr. Waller, please advise.

## 2018-08-08 NOTE — TELEPHONE ENCOUNTER
LVM for patient to inform her that I will fax rx for orthotics over to the [facility] and mail her the original rx.

## 2018-08-23 ENCOUNTER — TRANSCRIBE ORDERS (OUTPATIENT)
Dept: ADMINISTRATIVE | Facility: HOSPITAL | Age: 68
End: 2018-08-23

## 2018-08-23 ENCOUNTER — HOSPITAL ENCOUNTER (OUTPATIENT)
Dept: GENERAL RADIOLOGY | Facility: HOSPITAL | Age: 68
Discharge: HOME | End: 2018-08-23
Admitting: SURGERY
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
## 2018-08-23 PROCEDURE — 72052 X-RAY EXAM NECK SPINE 6/>VWS: CPT

## 2018-08-23 PROCEDURE — 73030 X-RAY EXAM OF SHOULDER: CPT

## 2018-12-21 ENCOUNTER — TRANSCRIBE ORDERS (OUTPATIENT)
Dept: ADMINISTRATIVE | Facility: HOSPITAL | Age: 68
End: 2018-12-21

## 2018-12-21 DIAGNOSIS — Z12.31 VISIT FOR SCREENING MAMMOGRAM: Primary | ICD-10-CM

## 2019-02-07 ENCOUNTER — APPOINTMENT (OUTPATIENT)
Dept: MAMMOGRAPHY | Facility: HOSPITAL | Age: 69
End: 2019-02-07
Attending: OBSTETRICS & GYNECOLOGY

## 2019-02-19 ENCOUNTER — HOSPITAL ENCOUNTER (OUTPATIENT)
Dept: MAMMOGRAPHY | Facility: HOSPITAL | Age: 69
Discharge: HOME | End: 2019-02-19
Attending: OBSTETRICS & GYNECOLOGY | Admitting: OBSTETRICS & GYNECOLOGY

## 2019-02-19 DIAGNOSIS — Z12.31 VISIT FOR SCREENING MAMMOGRAM: ICD-10-CM

## 2019-02-19 PROCEDURE — 77063 BREAST TOMOSYNTHESIS BI: CPT | Performed by: RADIOLOGY

## 2019-02-19 PROCEDURE — 77067 SCR MAMMO BI INCL CAD: CPT | Performed by: RADIOLOGY

## 2019-02-19 PROCEDURE — 77067 SCR MAMMO BI INCL CAD: CPT

## 2019-02-19 PROCEDURE — 77063 BREAST TOMOSYNTHESIS BI: CPT

## 2019-02-26 ENCOUNTER — HOSPITAL ENCOUNTER (OUTPATIENT)
Dept: MAMMOGRAPHY | Facility: HOSPITAL | Age: 69
Discharge: HOME | End: 2019-02-26
Admitting: RADIOLOGY

## 2019-02-26 DIAGNOSIS — R92.8 ABNORMAL MAMMOGRAM: ICD-10-CM

## 2019-02-26 PROCEDURE — G0279 TOMOSYNTHESIS, MAMMO: HCPCS

## 2019-02-26 PROCEDURE — G0279 TOMOSYNTHESIS, MAMMO: HCPCS | Performed by: RADIOLOGY

## 2019-02-26 PROCEDURE — 77065 DX MAMMO INCL CAD UNI: CPT | Performed by: RADIOLOGY

## 2019-02-26 PROCEDURE — 77065 DX MAMMO INCL CAD UNI: CPT

## 2019-06-14 ENCOUNTER — HOSPITAL ENCOUNTER (OUTPATIENT)
Dept: GENERAL RADIOLOGY | Facility: HOSPITAL | Age: 69
Discharge: HOME | End: 2019-06-14
Admitting: FAMILY MEDICINE

## 2019-06-14 ENCOUNTER — TRANSCRIBE ORDERS (OUTPATIENT)
Dept: ADMINISTRATIVE | Facility: HOSPITAL | Age: 69
End: 2019-06-14

## 2019-06-14 DIAGNOSIS — M54.2 NECK PAIN: Primary | ICD-10-CM

## 2019-06-14 DIAGNOSIS — T14.90XA TRAUMA: ICD-10-CM

## 2019-06-14 PROCEDURE — 72052 X-RAY EXAM NECK SPINE 6/>VWS: CPT

## 2019-07-15 ENCOUNTER — TRANSCRIBE ORDERS (OUTPATIENT)
Dept: ADMINISTRATIVE | Facility: HOSPITAL | Age: 69
End: 2019-07-15

## 2019-07-15 DIAGNOSIS — R92.8 ABNORMAL MAMMOGRAM: Primary | ICD-10-CM

## 2019-09-16 ENCOUNTER — HOSPITAL ENCOUNTER (OUTPATIENT)
Dept: MAMMOGRAPHY | Facility: HOSPITAL | Age: 69
Discharge: HOME | End: 2019-09-16
Admitting: OBSTETRICS & GYNECOLOGY

## 2019-09-16 DIAGNOSIS — R92.8 ABNORMAL MAMMOGRAM: ICD-10-CM

## 2019-09-16 PROCEDURE — 77065 DX MAMMO INCL CAD UNI: CPT

## 2019-09-16 PROCEDURE — G0279 TOMOSYNTHESIS, MAMMO: HCPCS | Performed by: RADIOLOGY

## 2019-09-16 PROCEDURE — 77065 DX MAMMO INCL CAD UNI: CPT | Performed by: RADIOLOGY

## 2019-09-16 PROCEDURE — G0279 TOMOSYNTHESIS, MAMMO: HCPCS

## 2020-04-02 ENCOUNTER — TELEPHONE (OUTPATIENT)
Dept: ORTHOPEDIC SURGERY | Facility: CLINIC | Age: 70
End: 2020-04-02

## 2020-04-02 NOTE — TELEPHONE ENCOUNTER
PATIENT CALLED REQUESTING ANOTHER ORTHOTICS SCRIPT SENT TO [facility] AT THE [facility]. PLEASE CALL PATIENT [phone]

## 2020-04-03 NOTE — TELEPHONE ENCOUNTER
Faxed the script to [facility] and mailed it to the patient today. Called and left a voicemail letting the patient know this.  Alina

## 2020-04-08 NOTE — TELEPHONE ENCOUNTER
PATIENT CALLED STATED SHE WENT TO GET HER ORTHOTICS  AND SHE WAS INFORMED THEY NEVER GOT THE ORDER. PATIENT ASKED TO HAVE THE ORDER SENT OVER [phone]. PATIENT CAN BE REACHED [phone]

## 2020-06-09 NOTE — TELEPHONE ENCOUNTER
I made a new script and faxed it to [facility] and called to let the patient know.  Alina   epigastric area

## 2020-07-22 ENCOUNTER — TRANSCRIBE ORDERS (OUTPATIENT)
Dept: ADMINISTRATIVE | Facility: HOSPITAL | Age: 70
End: 2020-07-22

## 2020-07-22 DIAGNOSIS — Z12.31 VISIT FOR SCREENING MAMMOGRAM: Primary | ICD-10-CM

## 2020-11-04 ENCOUNTER — HOSPITAL ENCOUNTER (OUTPATIENT)
Dept: MAMMOGRAPHY | Facility: HOSPITAL | Age: 70
Discharge: HOME | End: 2020-11-04
Admitting: OBSTETRICS & GYNECOLOGY

## 2020-11-04 DIAGNOSIS — Z12.31 VISIT FOR SCREENING MAMMOGRAM: ICD-10-CM

## 2020-11-04 PROCEDURE — 77063 BREAST TOMOSYNTHESIS BI: CPT

## 2020-11-04 PROCEDURE — 77067 SCR MAMMO BI INCL CAD: CPT | Performed by: RADIOLOGY

## 2020-11-04 PROCEDURE — 77067 SCR MAMMO BI INCL CAD: CPT

## 2020-11-04 PROCEDURE — 77063 BREAST TOMOSYNTHESIS BI: CPT | Performed by: RADIOLOGY

## 2021-07-08 ENCOUNTER — TELEPHONE (OUTPATIENT)
Dept: ORTHOPEDIC SURGERY | Facility: CLINIC | Age: 71
End: 2021-07-08

## 2021-07-08 NOTE — TELEPHONE ENCOUNTER
PATIENT CALLED ANDSTATED SHE NEEDS A NEW ORTHOTICS SCRIPT SENT TO [facility]. PATIENT WOULD IBETH A CALL BACK TO CONFIRM IT WAS SENT [phone]

## 2021-07-12 NOTE — TELEPHONE ENCOUNTER
Fax number was not connecting, [facility] [place] said they were not having issues.  I emailed the prescription out Friday 07.09.21

## 2021-07-12 NOTE — TELEPHONE ENCOUNTER
I called the patient to let her know we had to mail the prescription Friday.  She said she will call the [facility] Wednesday to set up an appointment with them.

## 2021-07-22 ENCOUNTER — TELEPHONE (OUTPATIENT)
Dept: GASTROENTEROLOGY | Facility: CLINIC | Age: 71
End: 2021-07-22

## 2021-07-22 NOTE — TELEPHONE ENCOUNTER
Dr Hoang,  I spoke with Mrs Mccallum this morning. Patient has hx: Diverticulosis & IBS-D. She hasn't had any IBS symptoms for 10-12 years. Now, she is experiencing Frequent bowel movements(skinny small stool), and severe gas. You advised to take Miralax 2 capfuls daily & Align n the past. The Align caused her to have diarrhea; so she stop taking it. Now, taking Benefiber which is causing gas too. Cut down on Miralax to 1/2 capful. These symptoms are affecting her daily life. Follow up isn't schedule until 8/31/2021 & she doesn't know what to do until then. Please advise. Thanks

## 2021-08-31 ENCOUNTER — OFFICE VISIT (OUTPATIENT)
Dept: GASTROENTEROLOGY | Facility: CLINIC | Age: 71
End: 2021-08-31

## 2021-08-31 VITALS
OXYGEN SATURATION: 97 % | DIASTOLIC BLOOD PRESSURE: 86 MMHG | TEMPERATURE: 97.1 F | BODY MASS INDEX: 23.93 KG/M2 | SYSTOLIC BLOOD PRESSURE: 130 MMHG | HEART RATE: 84 BPM | HEIGHT: 65 IN | WEIGHT: 143.6 LBS

## 2021-08-31 DIAGNOSIS — K57.30 DIVERTICULAR DISEASE OF COLON: Primary | ICD-10-CM

## 2021-08-31 DIAGNOSIS — R19.8 IRREGULAR BOWEL HABITS: ICD-10-CM

## 2021-08-31 PROCEDURE — 99203 OFFICE O/P NEW LOW 30 MIN: CPT | Performed by: INTERNAL MEDICINE

## 2021-08-31 RX ORDER — NITROFURANTOIN 25; 75 MG/1; MG/1
CAPSULE ORAL
COMMUNITY
End: 2021-08-31

## 2021-08-31 RX ORDER — OCTISALATE, AVOBENZONE, HOMOSALATE, AND OCTOCRYLENE 29.4; 29.4; 49; 25.48 MG/ML; MG/ML; MG/ML; MG/ML
LOTION TOPICAL
COMMUNITY

## 2021-08-31 RX ORDER — ONDANSETRON 4 MG/1
4 TABLET, FILM COATED ORAL EVERY 8 HOURS PRN
COMMUNITY

## 2021-08-31 RX ORDER — ATORVASTATIN CALCIUM 10 MG/1
TABLET, FILM COATED ORAL
COMMUNITY
End: 2021-08-31

## 2021-08-31 RX ORDER — ZOSTER VACCINE RECOMBINANT, ADJUVANTED 50 MCG/0.5
KIT INTRAMUSCULAR
COMMUNITY

## 2021-08-31 RX ORDER — MUPIROCIN CALCIUM 20 MG/G
CREAM TOPICAL
COMMUNITY
End: 2021-08-31

## 2021-08-31 RX ORDER — ROPINIROLE 1 MG/1
TABLET, FILM COATED ORAL
COMMUNITY
End: 2021-08-31

## 2021-08-31 RX ORDER — ACYCLOVIR 200 MG/1
200 CAPSULE ORAL AS NEEDED
COMMUNITY

## 2021-12-29 ENCOUNTER — TRANSCRIBE ORDERS (OUTPATIENT)
Dept: ADMINISTRATIVE | Facility: HOSPITAL | Age: 71
End: 2021-12-29

## 2021-12-29 DIAGNOSIS — Z12.31 ENCOUNTER FOR SCREENING MAMMOGRAM FOR MALIGNANT NEOPLASM OF BREAST: Primary | ICD-10-CM

## 2022-01-31 ENCOUNTER — HOSPITAL ENCOUNTER (OUTPATIENT)
Dept: MAMMOGRAPHY | Facility: HOSPITAL | Age: 72
Discharge: HOME | End: 2022-01-31
Admitting: OBSTETRICS & GYNECOLOGY

## 2022-01-31 DIAGNOSIS — Z12.31 ENCOUNTER FOR SCREENING MAMMOGRAM FOR MALIGNANT NEOPLASM OF BREAST: ICD-10-CM

## 2022-01-31 PROCEDURE — 77063 BREAST TOMOSYNTHESIS BI: CPT

## 2022-01-31 PROCEDURE — 77063 BREAST TOMOSYNTHESIS BI: CPT | Performed by: RADIOLOGY

## 2022-01-31 PROCEDURE — 77067 SCR MAMMO BI INCL CAD: CPT

## 2022-01-31 PROCEDURE — 77067 SCR MAMMO BI INCL CAD: CPT | Performed by: RADIOLOGY

## 2022-09-26 ENCOUNTER — TELEPHONE (OUTPATIENT)
Dept: ORTHOPEDIC SURGERY | Facility: CLINIC | Age: 72
End: 2022-09-26

## 2022-09-26 NOTE — TELEPHONE ENCOUNTER
"PATIENT CALLING NEEDING AN ORDER FOR HER TURFTOE LEFT ORTHODIC.    [facility] - ON [address].  PHONE: [phone]    PLEASE ADVISE.  "
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
## 2023-01-26 ENCOUNTER — TRANSCRIBE ORDERS (OUTPATIENT)
Dept: ADMINISTRATIVE | Facility: HOSPITAL | Age: 73
End: 2023-01-26
Payer: COMMERCIAL

## 2023-01-26 DIAGNOSIS — Z12.31 VISIT FOR SCREENING MAMMOGRAM: Primary | ICD-10-CM

## 2023-02-21 ENCOUNTER — HOSPITAL ENCOUNTER (OUTPATIENT)
Dept: MAMMOGRAPHY | Facility: HOSPITAL | Age: 73
Discharge: HOME | End: 2023-02-21
Admitting: OBSTETRICS & GYNECOLOGY
Payer: COMMERCIAL

## 2023-02-21 DIAGNOSIS — Z12.31 VISIT FOR SCREENING MAMMOGRAM: ICD-10-CM

## 2023-02-21 PROCEDURE — 77067 SCR MAMMO BI INCL CAD: CPT | Performed by: RADIOLOGY

## 2023-02-21 PROCEDURE — 77063 BREAST TOMOSYNTHESIS BI: CPT

## 2023-02-21 PROCEDURE — 77067 SCR MAMMO BI INCL CAD: CPT

## 2023-02-21 PROCEDURE — 77063 BREAST TOMOSYNTHESIS BI: CPT | Performed by: RADIOLOGY

## 2023-04-18 ENCOUNTER — TRANSCRIBE ORDERS (OUTPATIENT)
Dept: ADMINISTRATIVE | Facility: HOSPITAL | Age: 73
End: 2023-04-18
Payer: COMMERCIAL

## 2023-04-18 DIAGNOSIS — Z13.820 ENCOUNTER FOR SCREENING FOR OSTEOPOROSIS: Primary | ICD-10-CM

## 2023-07-27 ENCOUNTER — OUTSIDE FACILITY SERVICE (OUTPATIENT)
Dept: GASTROENTEROLOGY | Facility: CLINIC | Age: 73
End: 2023-07-27
Payer: COMMERCIAL

## 2023-07-27 PROCEDURE — 45378 DIAGNOSTIC COLONOSCOPY: CPT | Performed by: INTERNAL MEDICINE

## 2023-07-28 ENCOUNTER — HOSPITAL ENCOUNTER (OUTPATIENT)
Dept: BONE DENSITY | Facility: HOSPITAL | Age: 73
Discharge: HOME | End: 2023-07-28
Admitting: PHYSICIAN ASSISTANT
Payer: COMMERCIAL

## 2023-07-28 DIAGNOSIS — Z13.820 ENCOUNTER FOR SCREENING FOR OSTEOPOROSIS: ICD-10-CM

## 2023-07-28 PROCEDURE — 77080 DXA BONE DENSITY AXIAL: CPT

## 2023-08-17 ENCOUNTER — HOSPITAL ENCOUNTER (EMERGENCY)
Facility: HOSPITAL | Age: 73
Discharge: HOME | End: 2023-08-17
Attending: EMERGENCY MEDICINE
Payer: COMMERCIAL

## 2023-08-17 ENCOUNTER — APPOINTMENT (OUTPATIENT)
Dept: CT IMAGING | Facility: HOSPITAL | Age: 73
End: 2023-08-17
Payer: COMMERCIAL

## 2023-08-17 VITALS
DIASTOLIC BLOOD PRESSURE: 75 MMHG | OXYGEN SATURATION: 100 % | SYSTOLIC BLOOD PRESSURE: 120 MMHG | BODY MASS INDEX: 23.16 KG/M2 | HEART RATE: 86 BPM | TEMPERATURE: 97.5 F | RESPIRATION RATE: 20 BRPM | WEIGHT: 139 LBS | HEIGHT: 65 IN
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
## 2023-08-17 DIAGNOSIS — S01.01XA SCALP LACERATION, INITIAL ENCOUNTER: ICD-10-CM

## 2023-08-17 DIAGNOSIS — S00.83XA CONTUSION OF FACE, INITIAL ENCOUNTER: ICD-10-CM

## 2023-08-17 DIAGNOSIS — M54.2 MUSCULOSKELETAL NECK PAIN: ICD-10-CM

## 2023-08-17 DIAGNOSIS — Z86.59 HISTORY OF ANXIETY: ICD-10-CM

## 2023-08-17 DIAGNOSIS — Z87.39 HISTORY OF OSTEOARTHRITIS: ICD-10-CM

## 2023-08-17 PROCEDURE — 70450 CT HEAD/BRAIN W/O DYE: CPT

## 2023-08-17 PROCEDURE — 70486 CT MAXILLOFACIAL W/O DYE: CPT

## 2023-08-17 PROCEDURE — 99284 EMERGENCY DEPT VISIT MOD MDM: CPT

## 2023-08-17 PROCEDURE — 72125 CT NECK SPINE W/O DYE: CPT

## 2023-08-17 RX ORDER — CEPHALEXIN 250 MG/1
500 CAPSULE ORAL ONCE
Status: COMPLETED | OUTPATIENT
Start: 2023-08-17 | End: 2023-08-17

## 2023-08-17 RX ORDER — LIDOCAINE HYDROCHLORIDE AND EPINEPHRINE 10; 10 MG/ML; UG/ML
10 INJECTION, SOLUTION INFILTRATION; PERINEURAL ONCE
Status: COMPLETED | OUTPATIENT
Start: 2023-08-17 | End: 2023-08-17

## 2023-08-17 RX ORDER — BACITRACIN ZINC, NEOMYCIN SULFATE AND POLYMYXIN B SULFATE 400; 5; 5000 [IU]/G; MG/G; [IU]/G
1 OINTMENT TOPICAL ONCE
Status: COMPLETED | OUTPATIENT
Start: 2023-08-17 | End: 2023-08-17

## 2023-08-17 RX ORDER — CEPHALEXIN 500 MG/1
500 CAPSULE ORAL 2 TIMES DAILY
Qty: 10 CAPSULE | Refills: 0 | Status: SHIPPED | OUTPATIENT
Start: 2023-08-17 | End: 2023-08-22

## 2023-08-17 RX ADMIN — BACITRACIN ZINC, NEOMYCIN SULFATE AND POLYMYXIN B SULFATE 1 APPLICATION: 400; 5; 5000 OINTMENT TOPICAL at 22:40

## 2023-08-17 RX ADMIN — LIDOCAINE HYDROCHLORIDE AND EPINEPHRINE 10 ML: 10; 10 INJECTION, SOLUTION INFILTRATION; PERINEURAL at 22:40

## 2023-08-17 RX ADMIN — CEPHALEXIN 500 MG: 250 CAPSULE ORAL at 22:53

## 2023-08-18 NOTE — DISCHARGE INSTRUCTIONS
Symptomatic care is recommended. Take all medications as prescribed and instructed. Take and complete full course of antibiotics as prescribed. Follow up with primary care as directed for suture removal in five days or return to Emergency Department with worsening of symptoms.
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
## 2024-04-16 ENCOUNTER — TRANSCRIBE ORDERS (OUTPATIENT)
Dept: ADMINISTRATIVE | Facility: HOSPITAL | Age: 74
End: 2024-04-16
Payer: COMMERCIAL

## 2024-04-16 DIAGNOSIS — Z12.31 SCREENING MAMMOGRAM FOR BREAST CANCER: Primary | ICD-10-CM

## 2024-05-15 ENCOUNTER — HOSPITAL ENCOUNTER (OUTPATIENT)
Dept: MAMMOGRAPHY | Facility: HOSPITAL | Age: 74
Discharge: HOME | End: 2024-05-15
Payer: COMMERCIAL

## 2024-05-15 DIAGNOSIS — Z12.31 SCREENING MAMMOGRAM FOR BREAST CANCER: ICD-10-CM

## 2024-05-29 ENCOUNTER — HOSPITAL ENCOUNTER (OUTPATIENT)
Dept: MAMMOGRAPHY | Facility: HOSPITAL | Age: 74
Discharge: HOME | End: 2024-05-29
Admitting: OBSTETRICS & GYNECOLOGY
Payer: COMMERCIAL

## 2024-05-29 PROCEDURE — 77067 SCR MAMMO BI INCL CAD: CPT

## 2024-05-29 PROCEDURE — 77063 BREAST TOMOSYNTHESIS BI: CPT

## 2024-06-03 ENCOUNTER — OFFICE VISIT (OUTPATIENT)
Dept: ORTHOPEDIC SURGERY | Facility: CLINIC | Age: 74
End: 2024-06-03
Payer: COMMERCIAL

## 2024-06-03 VITALS
HEIGHT: 65 IN | DIASTOLIC BLOOD PRESSURE: 76 MMHG | SYSTOLIC BLOOD PRESSURE: 122 MMHG | BODY MASS INDEX: 23.82 KG/M2 | WEIGHT: 143 LBS

## 2024-06-03 DIAGNOSIS — S92.025A CLOSED NONDISPLACED FRACTURE OF ANTERIOR PROCESS OF LEFT CALCANEUS, INITIAL ENCOUNTER: Primary | ICD-10-CM

## 2024-06-03 PROCEDURE — 99204 OFFICE O/P NEW MOD 45 MIN: CPT | Performed by: ORTHOPAEDIC SURGERY

## 2024-06-03 RX ORDER — PAROXETINE 10 MG/1
10 TABLET, FILM COATED ORAL EVERY MORNING
COMMUNITY
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
## 2024-09-09 ENCOUNTER — OFFICE VISIT (OUTPATIENT)
Dept: ORTHOPEDIC SURGERY | Facility: CLINIC | Age: 74
End: 2024-09-09
Payer: COMMERCIAL

## 2024-09-09 VITALS
HEIGHT: 65 IN | WEIGHT: 148 LBS | SYSTOLIC BLOOD PRESSURE: 124 MMHG | DIASTOLIC BLOOD PRESSURE: 76 MMHG | BODY MASS INDEX: 24.66 KG/M2

## 2024-09-09 DIAGNOSIS — S92.025A CLOSED NONDISPLACED FRACTURE OF ANTERIOR PROCESS OF LEFT CALCANEUS, INITIAL ENCOUNTER: Primary | ICD-10-CM

## 2024-09-09 PROCEDURE — 99212 OFFICE O/P EST SF 10 MIN: CPT | Performed by: ORTHOPAEDIC SURGERY
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
## 2025-04-28 ENCOUNTER — TRANSCRIBE ORDERS (OUTPATIENT)
Dept: ADMINISTRATIVE | Facility: HOSPITAL | Age: 75
End: 2025-04-28
Payer: COMMERCIAL

## 2025-04-28 DIAGNOSIS — Z12.31 ENCOUNTER FOR SCREENING MAMMOGRAM FOR BREAST CANCER: Primary | ICD-10-CM

## 2025-05-01 ENCOUNTER — TRANSCRIBE ORDERS (OUTPATIENT)
Dept: ADMINISTRATIVE | Facility: HOSPITAL | Age: 75
End: 2025-05-01
Payer: COMMERCIAL

## 2025-05-01 DIAGNOSIS — Z12.39 ENCOUNTER FOR OTHER SCREENING FOR MALIGNANT NEOPLASM OF BREAST: Primary | ICD-10-CM

## 2025-05-25 LAB
NCCN CRITERIA FLAG: NORMAL
TYRER CUZICK SCORE: 1.7

## 2025-05-30 ENCOUNTER — HOSPITAL ENCOUNTER (OUTPATIENT)
Dept: MAMMOGRAPHY | Facility: HOSPITAL | Age: 75
Discharge: HOME | End: 2025-05-30
Admitting: OBSTETRICS & GYNECOLOGY
Payer: COMMERCIAL

## 2025-05-30 DIAGNOSIS — Z12.31 ENCOUNTER FOR SCREENING MAMMOGRAM FOR BREAST CANCER: ICD-10-CM

## 2025-05-30 PROCEDURE — 77063 BREAST TOMOSYNTHESIS BI: CPT

## 2025-05-30 PROCEDURE — 77067 SCR MAMMO BI INCL CAD: CPT
